# Patient Record
Sex: FEMALE | Race: BLACK OR AFRICAN AMERICAN | NOT HISPANIC OR LATINO | Employment: UNEMPLOYED | URBAN - METROPOLITAN AREA
[De-identification: names, ages, dates, MRNs, and addresses within clinical notes are randomized per-mention and may not be internally consistent; named-entity substitution may affect disease eponyms.]

---

## 2018-01-31 ENCOUNTER — HISTORICAL (OUTPATIENT)
Dept: ADMINISTRATIVE | Facility: HOSPITAL | Age: 23
End: 2018-01-31

## 2018-01-31 LAB
ABS NEUT (OLG): 5.11 X10(3)/MCL (ref 2.1–9.2)
BASOPHILS # BLD AUTO: 0.02 X10(3)/MCL
BASOPHILS NFR BLD AUTO: 0 % (ref 0–1)
BILIRUB SERPL-MCNC: NEGATIVE MG/DL
BUN SERPL-MCNC: 10 MG/DL (ref 7–18)
CALCIUM SERPL-MCNC: 9.3 MG/DL (ref 8.5–10.1)
CHLORIDE SERPL-SCNC: 99 MMOL/L (ref 98–107)
CLARITY, POC UA: NORMAL
CO2 SERPL-SCNC: 26 MMOL/L (ref 21–32)
COLOR, POC UA: YELLOW
CREAT SERPL-MCNC: 0.6 MG/DL (ref 0.6–1.3)
EOSINOPHIL # BLD AUTO: 0.05 X10(3)/MCL
EOSINOPHIL NFR BLD AUTO: 1 % (ref 0–5)
ERYTHROCYTE [DISTWIDTH] IN BLOOD BY AUTOMATED COUNT: 13.4 % (ref 11.5–14.5)
GLUCOSE SERPL-MCNC: 100 MG/DL (ref 74–106)
GLUCOSE UR QL STRIP: NEGATIVE
HBV SURFACE AG SERPL QL IA: NEGATIVE
HCT VFR BLD AUTO: 33.7 % (ref 35–46)
HCV AB SERPL QL IA: NONREACTIVE
HGB BLD-MCNC: 11.3 GM/DL (ref 12–16)
HIV 1+2 AB+HIV1 P24 AG SERPL QL IA: NONREACTIVE
IMM GRANULOCYTES # BLD AUTO: 0.03 10*3/UL
IMM GRANULOCYTES NFR BLD AUTO: 0 %
KETONES UR QL STRIP: NEGATIVE
LEUKOCYTE EST, POC UA: NEGATIVE
LYMPHOCYTES # BLD AUTO: 1.66 X10(3)/MCL
LYMPHOCYTES NFR BLD AUTO: 23 % (ref 15–40)
MCH RBC QN AUTO: 28.2 PG (ref 26–34)
MCHC RBC AUTO-ENTMCNC: 33.5 GM/DL (ref 31–37)
MCV RBC AUTO: 84 FL (ref 80–100)
MONOCYTES # BLD AUTO: 0.27 X10(3)/MCL
MONOCYTES NFR BLD AUTO: 4 % (ref 4–12)
NEUTROPHILS # BLD AUTO: 5.11 X10(3)/MCL
NEUTROPHILS NFR BLD AUTO: 72 X10(3)/MCL
NITRITE, POC UA: NEGATIVE
PH, POC UA: 7
PLATELET # BLD AUTO: 194 X10(3)/MCL (ref 130–400)
PMV BLD AUTO: 12.1 FL (ref 7.4–10.4)
POTASSIUM SERPL-SCNC: 3.2 MMOL/L (ref 3.5–5.1)
PROTEIN, POC: NEGATIVE
RBC # BLD AUTO: 4.01 X10(6)/MCL (ref 4–5.2)
RPR SER QL: NORMAL
SODIUM SERPL-SCNC: 133 MMOL/L (ref 136–145)
SPECIFIC GRAVITY, POC UA: 1.01
T PALLIDUM AB SER QL: REACTIVE
UROBILINOGEN, POC UA: NORMAL
WBC # SPEC AUTO: 7.1 X10(3)/MCL (ref 4.5–11)

## 2018-02-05 LAB
BILIRUB SERPL-MCNC: NEGATIVE MG/DL
BLOOD URINE, POC: NEGATIVE
CLARITY, POC UA: CLEAR
COLOR, POC UA: YELLOW
GLUCOSE UR QL STRIP: NEGATIVE
KETONES UR QL STRIP: NEGATIVE
LEUKOCYTE EST, POC UA: NEGATIVE
NITRITE, POC UA: NEGATIVE
PH, POC UA: 6.5
PROTEIN, POC: NEGATIVE
SPECIFIC GRAVITY, POC UA: 1
UROBILINOGEN, POC UA: NORMAL

## 2018-03-05 LAB
BILIRUB SERPL-MCNC: NEGATIVE MG/DL
BLOOD URINE, POC: NEGATIVE
CLARITY, POC UA: NORMAL
COLOR, POC UA: YELLOW
GLUCOSE UR QL STRIP: NEGATIVE
KETONES UR QL STRIP: NEGATIVE
LEUKOCYTE EST, POC UA: NEGATIVE
NITRITE, POC UA: NEGATIVE
PH, POC UA: 6.5
PROTEIN, POC: NORMAL
SPECIFIC GRAVITY, POC UA: 1.01
UROBILINOGEN, POC UA: NORMAL

## 2018-04-03 LAB
BILIRUB SERPL-MCNC: NEGATIVE MG/DL
BLOOD URINE, POC: NEGATIVE
CLARITY, POC UA: NORMAL
COLOR, POC UA: NORMAL
GLUCOSE UR QL STRIP: NEGATIVE
KETONES UR QL STRIP: NEGATIVE
LEUKOCYTE EST, POC UA: NORMAL
NITRITE, POC UA: NEGATIVE
PH, POC UA: 7
PROTEIN, POC: NORMAL
SPECIFIC GRAVITY, POC UA: 1.01
UROBILINOGEN, POC UA: NORMAL

## 2018-05-02 LAB
BILIRUB SERPL-MCNC: NEGATIVE MG/DL
BLOOD URINE, POC: NEGATIVE
CLARITY, POC UA: NORMAL
COLOR, POC UA: YELLOW
GLUCOSE UR QL STRIP: NEGATIVE
KETONES UR QL STRIP: NEGATIVE
LEUKOCYTE EST, POC UA: NORMAL
NITRITE, POC UA: NEGATIVE
PH, POC UA: 7
PROTEIN, POC: NORMAL
SPECIFIC GRAVITY, POC UA: 1.01
UROBILINOGEN, POC UA: NORMAL

## 2018-05-25 ENCOUNTER — HISTORICAL (OUTPATIENT)
Dept: ADMINISTRATIVE | Facility: HOSPITAL | Age: 23
End: 2018-05-25

## 2018-05-25 LAB
ABS NEUT (OLG): 5.76 X10(3)/MCL (ref 2.1–9.2)
BASOPHILS # BLD AUTO: 0.04 X10(3)/MCL
BASOPHILS NFR BLD AUTO: 0 %
BILIRUB SERPL-MCNC: NEGATIVE MG/DL
BLOOD URINE, POC: NEGATIVE
CLARITY, POC UA: CLEAR
COLOR, POC UA: NORMAL
EOSINOPHIL # BLD AUTO: 0.1 X10(3)/MCL
EOSINOPHIL NFR BLD AUTO: 1 %
ERYTHROCYTE [DISTWIDTH] IN BLOOD BY AUTOMATED COUNT: 13.3 % (ref 11.5–14.5)
GLUCOSE 1H P 100 G GLC PO SERPL-MCNC: 121 MG/DL
GLUCOSE UR QL STRIP: NEGATIVE
HCT VFR BLD AUTO: 28 % (ref 35–46)
HGB BLD-MCNC: 8.9 GM/DL (ref 12–16)
IMM GRANULOCYTES # BLD AUTO: 0.11 10*3/UL
IMM GRANULOCYTES NFR BLD AUTO: 1 %
KETONES UR QL STRIP: NORMAL
LEUKOCYTE EST, POC UA: NORMAL
LYMPHOCYTES # BLD AUTO: 2.03 X10(3)/MCL
LYMPHOCYTES NFR BLD AUTO: 24 % (ref 13–40)
MCH RBC QN AUTO: 28.3 PG (ref 26–34)
MCHC RBC AUTO-ENTMCNC: 31.8 GM/DL (ref 31–37)
MCV RBC AUTO: 88.9 FL (ref 80–100)
MONOCYTES # BLD AUTO: 0.3 X10(3)/MCL
MONOCYTES NFR BLD AUTO: 4 % (ref 4–12)
NEUTROPHILS # BLD AUTO: 5.76 X10(3)/MCL
NEUTROPHILS NFR BLD AUTO: 69 X10(3)/MCL
NITRITE, POC UA: NEGATIVE
PH, POC UA: 7
PLATELET # BLD AUTO: 239 X10(3)/MCL (ref 130–400)
PMV BLD AUTO: 11.2 FL (ref 7.4–10.4)
PROTEIN, POC: NORMAL
RBC # BLD AUTO: 3.15 X10(6)/MCL (ref 4–5.2)
RPR SER QL: REACTIVE
SPECIFIC GRAVITY, POC UA: 1
T PALLIDUM AB SER QL: REACTIVE
UROBILINOGEN, POC UA: NORMAL
WBC # SPEC AUTO: 8.3 X10(3)/MCL (ref 4.5–11)

## 2018-06-14 LAB
BILIRUB SERPL-MCNC: NEGATIVE MG/DL
BLOOD URINE, POC: NEGATIVE
CLARITY, POC UA: CLEAR
COLOR, POC UA: YELLOW
GLUCOSE UR QL STRIP: NEGATIVE
KETONES UR QL STRIP: NORMAL
LEUKOCYTE EST, POC UA: NORMAL
NITRITE, POC UA: NEGATIVE
PH, POC UA: 6.5
PROTEIN, POC: NORMAL
SPECIFIC GRAVITY, POC UA: 1.01
UROBILINOGEN, POC UA: NORMAL

## 2018-07-03 LAB
BILIRUB SERPL-MCNC: NEGATIVE MG/DL
BLOOD URINE, POC: NEGATIVE
CLARITY, POC UA: NORMAL
COLOR, POC UA: NORMAL
GLUCOSE UR QL STRIP: NEGATIVE
KETONES UR QL STRIP: NEGATIVE
LEUKOCYTE EST, POC UA: NEGATIVE
NITRITE, POC UA: NEGATIVE
PH, POC UA: 7
PROTEIN, POC: NORMAL
SPECIFIC GRAVITY, POC UA: 1.01
UROBILINOGEN, POC UA: NORMAL

## 2018-07-17 ENCOUNTER — HISTORICAL (OUTPATIENT)
Dept: ADMINISTRATIVE | Facility: HOSPITAL | Age: 23
End: 2018-07-17

## 2018-07-17 LAB
ABS NEUT (OLG): 4.8 X10(3)/MCL (ref 2.1–9.2)
BASOPHILS # BLD AUTO: 0.03 X10(3)/MCL
BASOPHILS NFR BLD AUTO: 0 %
BILIRUB SERPL-MCNC: NEGATIVE MG/DL
BLOOD URINE, POC: NEGATIVE
CLARITY, POC UA: NORMAL
COLOR, POC UA: NORMAL
EOSINOPHIL # BLD AUTO: 0.04 10*3/UL
EOSINOPHIL NFR BLD AUTO: 0 %
ERYTHROCYTE [DISTWIDTH] IN BLOOD BY AUTOMATED COUNT: 14.4 % (ref 11.5–14.5)
GLUCOSE UR QL STRIP: NEGATIVE
HCT VFR BLD AUTO: 27.6 % (ref 35–46)
HGB BLD-MCNC: 8.8 GM/DL (ref 12–16)
HIV 1+2 AB+HIV1 P24 AG SERPL QL IA: NONREACTIVE
IMM GRANULOCYTES # BLD AUTO: 0.09 10*3/UL
IMM GRANULOCYTES NFR BLD AUTO: 1 %
KETONES UR QL STRIP: NEGATIVE
LEUKOCYTE EST, POC UA: NORMAL
LYMPHOCYTES # BLD AUTO: 1.83 X10(3)/MCL
LYMPHOCYTES NFR BLD AUTO: 25 % (ref 13–40)
MCH RBC QN AUTO: 28 PG (ref 26–34)
MCHC RBC AUTO-ENTMCNC: 31.9 GM/DL (ref 31–37)
MCV RBC AUTO: 87.9 FL (ref 80–100)
MONOCYTES # BLD AUTO: 0.5 X10(3)/MCL
MONOCYTES NFR BLD AUTO: 7 % (ref 4–12)
NEUTROPHILS # BLD AUTO: 4.8 X10(3)/MCL
NEUTROPHILS NFR BLD AUTO: 66 X10(3)/MCL
NITRITE, POC UA: NEGATIVE
PH, POC UA: 7
PLATELET # BLD AUTO: 218 X10(3)/MCL (ref 130–400)
PMV BLD AUTO: 11.6 FL (ref 7.4–10.4)
PROTEIN, POC: NORMAL
RBC # BLD AUTO: 3.14 X10(6)/MCL (ref 4–5.2)
RPR SER QL: REACTIVE
SPECIFIC GRAVITY, POC UA: 1.01
T PALLIDUM AB SER QL: REACTIVE
UROBILINOGEN, POC UA: NORMAL
WBC # SPEC AUTO: 7.3 X10(3)/MCL (ref 4.5–11)

## 2018-07-24 ENCOUNTER — HOSPITAL ENCOUNTER (OUTPATIENT)
Dept: OBSTETRICS AND GYNECOLOGY | Facility: HOSPITAL | Age: 23
End: 2018-07-24
Attending: OBSTETRICS & GYNECOLOGY | Admitting: OBSTETRICS & GYNECOLOGY

## 2018-07-24 LAB
BILIRUB SERPL-MCNC: NEGATIVE MG/DL
BLOOD URINE, POC: NEGATIVE
CLARITY, POC UA: CLEAR
COLOR, POC UA: NORMAL
GLUCOSE UR QL STRIP: NEGATIVE
KETONES UR QL STRIP: NEGATIVE
LEUKOCYTE EST, POC UA: NORMAL
NITRITE, POC UA: NEGATIVE
PH, POC UA: 7
PROTEIN, POC: NORMAL
SPECIFIC GRAVITY, POC UA: 1.01
UROBILINOGEN, POC UA: NORMAL

## 2018-07-30 LAB
BILIRUB SERPL-MCNC: NEGATIVE MG/DL
BLOOD URINE, POC: NEGATIVE
CLARITY, POC UA: CLEAR
COLOR, POC UA: YELLOW
GLUCOSE UR QL STRIP: NEGATIVE
KETONES UR QL STRIP: NEGATIVE
LEUKOCYTE EST, POC UA: NORMAL
NITRITE, POC UA: NEGATIVE
PH, POC UA: 7
PROTEIN, POC: NORMAL
SPECIFIC GRAVITY, POC UA: 1.01
UROBILINOGEN, POC UA: NORMAL

## 2018-09-06 LAB — POC BETA-HCG (QUAL): NEGATIVE

## 2018-10-19 LAB
BILIRUB SERPL-MCNC: NEGATIVE MG/DL
BLOOD URINE, POC: NEGATIVE
CLARITY, POC UA: NORMAL
COLOR, POC UA: YELLOW
GLUCOSE UR QL STRIP: NEGATIVE
KETONES UR QL STRIP: NEGATIVE
LEUKOCYTE EST, POC UA: NORMAL
NITRITE, POC UA: NEGATIVE
PH, POC UA: 6.5
POC BETA-HCG (QUAL): NEGATIVE
PROTEIN, POC: NEGATIVE
SPECIFIC GRAVITY, POC UA: 1.02
UROBILINOGEN, POC UA: NORMAL

## 2022-04-11 ENCOUNTER — HISTORICAL (OUTPATIENT)
Dept: ADMINISTRATIVE | Facility: HOSPITAL | Age: 27
End: 2022-04-11

## 2022-04-27 VITALS
HEIGHT: 62 IN | BODY MASS INDEX: 20.53 KG/M2 | SYSTOLIC BLOOD PRESSURE: 117 MMHG | OXYGEN SATURATION: 98 % | DIASTOLIC BLOOD PRESSURE: 70 MMHG | WEIGHT: 111.56 LBS

## 2022-09-22 ENCOUNTER — HISTORICAL (OUTPATIENT)
Dept: ADMINISTRATIVE | Facility: HOSPITAL | Age: 27
End: 2022-09-22

## 2023-10-09 ENCOUNTER — OFFICE VISIT (OUTPATIENT)
Dept: FAMILY MEDICINE | Facility: CLINIC | Age: 28
End: 2023-10-09
Payer: MEDICAID

## 2023-10-09 VITALS
HEART RATE: 110 BPM | OXYGEN SATURATION: 100 % | WEIGHT: 148.56 LBS | HEIGHT: 61 IN | BODY MASS INDEX: 28.05 KG/M2 | TEMPERATURE: 100 F | DIASTOLIC BLOOD PRESSURE: 80 MMHG | SYSTOLIC BLOOD PRESSURE: 116 MMHG

## 2023-10-09 DIAGNOSIS — R10.32 LEFT LOWER QUADRANT ABDOMINAL PAIN: ICD-10-CM

## 2023-10-09 DIAGNOSIS — Z30.432 ENCOUNTER FOR REMOVAL OF INTRAUTERINE CONTRACEPTIVE DEVICE (IUD): Primary | ICD-10-CM

## 2023-10-09 DIAGNOSIS — Z12.4 ENCOUNTER FOR PAPANICOLAOU SMEAR OF CERVIX: ICD-10-CM

## 2023-10-09 PROCEDURE — 87591 N.GONORRHOEAE DNA AMP PROB: CPT

## 2023-10-09 PROCEDURE — 99213 OFFICE O/P EST LOW 20 MIN: CPT | Mod: PBBFAC,25

## 2023-10-09 PROCEDURE — 88174 CYTOPATH C/V AUTO IN FLUID: CPT

## 2023-10-09 PROCEDURE — 87624 HPV HI-RISK TYP POOLED RSLT: CPT

## 2023-10-09 PROCEDURE — 87491 CHLMYD TRACH DNA AMP PROBE: CPT

## 2023-10-09 PROCEDURE — 58301 REMOVE INTRAUTERINE DEVICE: CPT | Mod: PBBFAC

## 2023-10-09 RX ORDER — MEDROXYPROGESTERONE ACETATE 150 MG/ML
150 INJECTION, SUSPENSION INTRAMUSCULAR
Status: DISCONTINUED | OUTPATIENT
Start: 2023-10-09 | End: 2023-10-09

## 2023-10-09 NOTE — PROGRESS NOTES
"[facility] [place]  Yaz Hutchinson  [id number]  10/09/2023    Chief Complaint   Patient presents with    IUD REMOVAL       Yaz Hutchinson is a 28 y.o. female  presenting to [facility] for IUD removal and pap smear. She has no past medical history. The IUD was placed at [facility] in 2018. She reports weight gain with no mood alteration while implanted. She desires the depo provera shot for further contraception. Complaint of left lower abdominal pain x2 days with urinary urgency. Tried otc azo pills, but did not help. Denies radiation, fever, chills, hematuria, dysuria, change in urinary frequency, vaginal discharge, vaginal odor, vaginal bleeding, itching, constipation, diarrhea, melena.     Surgical History  Appendectomy age 8  Cholecystectomy in     OBGYN History  , vaginal delivery x 3, last pregnancy positive for chlamydia and positive RPR and syphilis antibody with appropriate treatment.   Menarche at age 10, no period in the last 5 years due to IUD.    Social History:  Currently sexually active with 1 male partner, uses condoms, does not desire more children  Denies tobacco use, reports only occasional alcohol use      Review of Systems  As per HPI  Vitals:    10/09/23 1006   BP: 116/80   Pulse: 110   Temp: (!) 100.4 °F (38 °C)   TempSrc: Oral   SpO2: 100%   Weight: 67.4 kg (148 lb 9.4 oz)   Height: 5' 1" (1.549 m)      Physical Exam  Physical Exam  Exam conducted with a chaperone present.   Constitutional:       General: She is not in acute distress.     Comments: Pleasant, well appearing, NAD   Eyes:      Extraocular Movements: Extraocular movements intact.      Conjunctiva/sclera: Conjunctivae normal.   Abdominal:      General: Abdomen is flat.      Palpations: Abdomen is soft.      Tenderness: There is abdominal tenderness (mild left lower quadrant). There is no guarding.   Genitourinary:     Comments: The patient was given opportunity to empty bladder, and draped appropriately. Patient in lithotomy " position.     Vulva:no lesions  Vaginal: some discomfort on insertion of speculum, no atrophy, scant bloody discharge, no lesions   Cervix: multiparous slit like os, nontender, bloody discharge, IUD strings visualized  Bimanual exam deferred  Neurological:      General: No focal deficit present.      Mental Status: She is alert.   Psychiatric:         Mood and Affect: Mood normal.        Procedure Note  Procedure: IUD removal  Supervising physician: Dr. Lara Amaya  The speculum was placed and the IUD string was visualized. Using tenaculum instrument the cervix was stabilized, using ringed forceps, the IUD string was grasped and the device was removed without difficulty. The patient tolerated the procedure without complications. Standard post-procedure care was explained and return precautions given.    Current Medications:   No current outpatient medications on file.     No current facility-administered medications for this visit.       Assessment:   1. Encounter for removal of intrauterine contraceptive device (IUD)  Patient tolerated procedure without complication  Instructed to expect some spotting over the next few days, to take tylenol for pain  Interested in Depo provera for future contraception, could not leave urine for UPT today, will make appointment for depo at a later date    2. Encounter for Papanicolaou smear of cervix  Tolerated procedure, instructions as above for pain and spotting    3. Left lower quadrant abdominal pain  Low grade temper today, denied fevers at home, per exam and history, low suspicion for pyelonephritis at this time  Patient unable to leave urine for uti evaluation  Instructed to return to clinic  for urine evaluation when able or ER if symptoms persist or worsen    Orders Placed This Encounter    Liquid-Based Pap Smear, Screening Screening         Toni Kaur DO  [employer]  Resident, HO-1

## 2023-10-10 NOTE — PROGRESS NOTES
I reviewed History, PE, A/P and medical record.  Services provided in outpatient department of a teaching hospital/facility, I was immediately available.  I agree with resident. Care provided was reasonable and necessary.   I evaluated the patient with resident at time of visit. IUD removal uncomplicated.  Note reviewed: Tachycardic and febrile. Patient contacted: denied cough/cold symptoms. Reports feeling well. Does have dysuria. Unable to come to clinic this morning for assessment and UA/Culture. Would like to wait until 10/12/23 for follow up. Strict ED precautions given.

## 2023-10-11 LAB — PSYCHE PATHOLOGY RESULT: NORMAL

## 2023-10-12 ENCOUNTER — OFFICE VISIT (OUTPATIENT)
Dept: FAMILY MEDICINE | Facility: CLINIC | Age: 28
End: 2023-10-12
Payer: MEDICAID

## 2023-10-12 VITALS
DIASTOLIC BLOOD PRESSURE: 82 MMHG | TEMPERATURE: 99 F | OXYGEN SATURATION: 100 % | HEIGHT: 61 IN | BODY MASS INDEX: 27.6 KG/M2 | SYSTOLIC BLOOD PRESSURE: 118 MMHG | HEART RATE: 73 BPM | RESPIRATION RATE: 20 BRPM | WEIGHT: 146.19 LBS

## 2023-10-12 DIAGNOSIS — Z30.9 ENCOUNTER FOR CONTRACEPTIVE MANAGEMENT, UNSPECIFIED TYPE: Primary | ICD-10-CM

## 2023-10-12 DIAGNOSIS — Z23 IMMUNIZATION DUE: ICD-10-CM

## 2023-10-12 LAB
B-HCG UR QL: NEGATIVE
CTP QC/QA: YES

## 2023-10-12 PROCEDURE — 90471 IMMUNIZATION ADMIN: CPT | Mod: PBBFAC

## 2023-10-12 PROCEDURE — 90686 IIV4 VACC NO PRSV 0.5 ML IM: CPT | Mod: PBBFAC

## 2023-10-12 PROCEDURE — 96372 THER/PROPH/DIAG INJ SC/IM: CPT | Mod: PBBFAC

## 2023-10-12 PROCEDURE — 81025 URINE PREGNANCY TEST: CPT | Mod: PBBFAC | Performed by: STUDENT IN AN ORGANIZED HEALTH CARE EDUCATION/TRAINING PROGRAM

## 2023-10-12 PROCEDURE — 99213 OFFICE O/P EST LOW 20 MIN: CPT | Mod: PBBFAC | Performed by: STUDENT IN AN ORGANIZED HEALTH CARE EDUCATION/TRAINING PROGRAM

## 2023-10-12 RX ORDER — MEDROXYPROGESTERONE ACETATE 150 MG/ML
150 INJECTION, SUSPENSION INTRAMUSCULAR
Status: COMPLETED | OUTPATIENT
Start: 2023-10-12 | End: 2023-10-12

## 2023-10-12 RX ADMIN — MEDROXYPROGESTERONE ACETATE 150 MG: 150 INJECTION, SUSPENSION INTRAMUSCULAR at 09:10

## 2023-10-12 RX ADMIN — INFLUENZA VIRUS VACCINE 0.5 ML: 15; 15; 15; 15 SUSPENSION INTRAMUSCULAR at 09:10

## 2023-10-12 NOTE — PROGRESS NOTES
"[facility] [place]  Yaz Hutchinson  [id number]  10/12/2023    Chief Complaint   Patient presents with    Follow-up     Follow up depgeeta and upt ,wants flu vaccine       Yaz Hutchinson is a 28 y.o. female  presenting to [facility] for discussion of contraception management.  Notably, IUD removed on 10/09/2023 as was .  Interested in Depo-Provera injections, tolerated well in past.  Has been amenorrheic since IUD insertion in no sexual activity or bleeding in interim.    Surgical History  Appendectomy age 8  Cholecystectomy in 2015    OBGYN History  , vaginal delivery x 3, last pregnancy positive for chlamydia and positive RPR and syphilis antibody with appropriate treatment.   Menarche at age 10, no period in the last 5 years due to IUD.    Social History:  Currently sexually active with 1 male partner, uses condoms, does not desire more children  Denies tobacco use, reports only occasional alcohol use      Review of Systems  As per HPI  Vitals:    10/12/23 0904   BP: 118/82   Pulse: 73   Resp: 20   Temp: 98.6 °F (37 °C)   SpO2: 100%   Weight: 66.3 kg (146 lb 3.2 oz)   Height: 5' 1" (1.549 m)      Physical Exam  Physical Exam  Exam conducted with a chaperone present.   Constitutional:       General: She is not in acute distress.     Comments: Pleasant, well appearing, NAD   HENT:      Head: Normocephalic.   Eyes:      Extraocular Movements: Extraocular movements intact.      Conjunctiva/sclera: Conjunctivae normal.   Cardiovascular:      Rate and Rhythm: Normal rate and regular rhythm.      Pulses: Normal pulses.   Pulmonary:      Effort: Pulmonary effort is normal.   Abdominal:      General: Abdomen is flat.      Palpations: Abdomen is soft.      Tenderness: There is no abdominal tenderness (mild left lower quadrant).   Musculoskeletal:      Cervical back: Normal range of motion.   Neurological:      General: No focal deficit present.      Mental Status: She is alert.   Psychiatric:         Mood and " Affect: Mood normal.            Assessment:   1. Encounter for contraceptive management, unspecified type    - discussed contraceptive options, risks and potential side effects.  Patient's opts for Depo injection today  - UPT negative  - POCT urine pregnancy  - medroxyPROGESTERone (DEPO-PROVERA) injection 150 mg    2. Immunization due    - influenza (QUADRIVALENT PF) vaccine 0.5 mL      Orders Placed This Encounter    POCT urine pregnancy    medroxyPROGESTERone (DEPO-PROVERA) injection 150 mg    influenza (QUADRIVALENT PF) vaccine 0.5 mL         Calvin Mendiola MD  [employer]  Resident HO-3

## 2023-10-12 NOTE — PROGRESS NOTES
I have discussed the case with the resident and reviewed the resident's history and physical, assessment, plan, and progress note. I agree with the findings.       Bucky Sahu MD  [facility]

## 2023-10-14 ENCOUNTER — TELEPHONE (OUTPATIENT)
Dept: FAMILY MEDICINE | Facility: CLINIC | Age: 28
End: 2023-10-14
Payer: MEDICAID

## 2023-10-14 RX ORDER — METRONIDAZOLE 500 MG/1
500 TABLET ORAL 2 TIMES DAILY
Qty: 14 TABLET | Refills: 0 | Status: SHIPPED | OUTPATIENT
Start: 2023-10-14 | End: 2023-10-21

## 2023-10-14 NOTE — TELEPHONE ENCOUNTER
Called patient with name and  verified with result, positive trich. No known drug allergies verified, was instructed to inform partner to see PCP.  Opportunity to ask questions was provided with rtc/ed precautions. Consider test of cure in 2 weeks to 3 months if partner has received treatment.

## 2024-02-16 ENCOUNTER — OFFICE VISIT (OUTPATIENT)
Dept: FAMILY MEDICINE | Facility: CLINIC | Age: 29
End: 2024-02-16
Payer: MEDICAID

## 2024-02-16 VITALS
SYSTOLIC BLOOD PRESSURE: 111 MMHG | RESPIRATION RATE: 18 BRPM | DIASTOLIC BLOOD PRESSURE: 77 MMHG | BODY MASS INDEX: 23.6 KG/M2 | WEIGHT: 125 LBS | TEMPERATURE: 99 F | OXYGEN SATURATION: 99 % | HEART RATE: 81 BPM | HEIGHT: 61 IN

## 2024-02-16 DIAGNOSIS — Z30.9 ENCOUNTER FOR CONTRACEPTIVE MANAGEMENT, UNSPECIFIED TYPE: Primary | ICD-10-CM

## 2024-02-16 LAB
B-HCG UR QL: NEGATIVE
CTP QC/QA: YES

## 2024-02-16 PROCEDURE — 96372 THER/PROPH/DIAG INJ SC/IM: CPT | Mod: PBBFAC

## 2024-02-16 PROCEDURE — 81025 URINE PREGNANCY TEST: CPT | Mod: PBBFAC | Performed by: STUDENT IN AN ORGANIZED HEALTH CARE EDUCATION/TRAINING PROGRAM

## 2024-02-16 PROCEDURE — 99213 OFFICE O/P EST LOW 20 MIN: CPT | Mod: PBBFAC,25 | Performed by: STUDENT IN AN ORGANIZED HEALTH CARE EDUCATION/TRAINING PROGRAM

## 2024-02-16 RX ORDER — MEDROXYPROGESTERONE ACETATE 150 MG/ML
150 INJECTION, SUSPENSION INTRAMUSCULAR
Status: COMPLETED | OUTPATIENT
Start: 2024-02-16 | End: 2024-02-16

## 2024-02-16 RX ADMIN — MEDROXYPROGESTERONE ACETATE 150 MG: 150 INJECTION, SUSPENSION INTRAMUSCULAR at 03:02

## 2024-02-16 NOTE — PROGRESS NOTES
"[facility] Progress Note    ID:  Yaz Hutchinson   MRN:  [id number]     2/16/2024    Chief Complaint:  Contraception management    History of Present Illness:  Yaz Hutchinson is a 28 y.o. female who presents to [facility] clinic for contraception management.  Currently treated with Depo-Provera injections, last 10/2023 need for rescheduled for appointment.  Is sexually active with 1 male partner, denies menstrual cycle, unknown LMP.  Prior methods include IUD.  Denies any bleeding, vaginal discharge, abdominal pain, concern for STI, prior hx of blood clot or migraine with aura.      Review of Systems:  See HPI      Physical Exam:  /77 (BP Location: Right arm, Patient Position: Sitting, BP Method: Medium (Automatic))   Pulse 81   Temp 98.6 °F (37 °C) (Oral)   Resp 18   Ht 5' 0.98" (1.549 m)   Wt 56.7 kg (125 lb)   LMP  (LMP Unknown)   SpO2 99%   BMI 23.63 kg/m²     Gen-well-appearing, NAD  Abd-  soft, NT  Skin-no wounds or rashes      Assessment/Plan:    1. Encounter for contraceptive management, unspecified type  - UPT neg  - Depo injection as below  - F/u in 11-12 weeks for repeat injection    Calvin Mendiola MD  [facility] Resident HO-3    "

## 2024-05-07 ENCOUNTER — OFFICE VISIT (OUTPATIENT)
Dept: FAMILY MEDICINE | Facility: CLINIC | Age: 29
End: 2024-05-07
Payer: MEDICAID

## 2024-05-07 VITALS
BODY MASS INDEX: 30.23 KG/M2 | TEMPERATURE: 98 F | HEIGHT: 60 IN | DIASTOLIC BLOOD PRESSURE: 78 MMHG | SYSTOLIC BLOOD PRESSURE: 115 MMHG | WEIGHT: 154 LBS | OXYGEN SATURATION: 100 % | RESPIRATION RATE: 18 BRPM | HEART RATE: 84 BPM

## 2024-05-07 DIAGNOSIS — Z30.42 ENCOUNTER FOR SURVEILLANCE OF INJECTABLE CONTRACEPTIVE: Primary | ICD-10-CM

## 2024-05-07 LAB
B-HCG UR QL: NEGATIVE
CTP QC/QA: YES

## 2024-05-07 PROCEDURE — 81025 URINE PREGNANCY TEST: CPT | Mod: PBBFAC | Performed by: STUDENT IN AN ORGANIZED HEALTH CARE EDUCATION/TRAINING PROGRAM

## 2024-05-07 PROCEDURE — 99214 OFFICE O/P EST MOD 30 MIN: CPT | Mod: PBBFAC | Performed by: STUDENT IN AN ORGANIZED HEALTH CARE EDUCATION/TRAINING PROGRAM

## 2024-05-07 PROCEDURE — 96372 THER/PROPH/DIAG INJ SC/IM: CPT | Mod: PBBFAC

## 2024-05-07 RX ORDER — MEDROXYPROGESTERONE ACETATE 150 MG/ML
150 INJECTION, SUSPENSION INTRAMUSCULAR ONCE
Qty: 1 ML | Refills: 0 | Status: SHIPPED | OUTPATIENT
Start: 2024-05-07 | End: 2024-05-07

## 2024-05-07 RX ORDER — MEDROXYPROGESTERONE ACETATE 150 MG/ML
150 INJECTION, SUSPENSION INTRAMUSCULAR ONCE
Status: COMPLETED | OUTPATIENT
Start: 2024-05-07 | End: 2024-05-07

## 2024-05-07 RX ADMIN — MEDROXYPROGESTERONE ACETATE 150 MG: 150 INJECTION, SUSPENSION INTRAMUSCULAR at 09:05

## 2024-05-07 NOTE — PROGRESS NOTES
[facility] Progress Note    ID:  Yaz Hutchinson   MRN:  [id number]     5/7/2024    Chief Complaint:  Contraception management    History of Present Illness:  Yaz Hutchinson is a 29 y.o. female who presents to [facility] clinic for contraception management.  Currently treated with Depo-Provera injections. Last injection 2/16/24 Sexually active with 1 male partner, denies menstrual cycle, unknown LMP.  Prior methods include IUD.  Denies any bleeding, vaginal discharge, abdominal pain, concern for STI, prior hx of blood clot or migraine with aura.      Review of Systems:  See HPI      Physical Exam:  /78 (BP Location: Left arm, Patient Position: Sitting, BP Method: Medium (Automatic))   Pulse 84   Temp 98.2 °F (36.8 °C) (Oral)   Resp 18   Ht 5' (1.524 m)   Wt 69.9 kg (154 lb)   SpO2 100%   BMI 30.08 kg/m²     Gen-well-appearing, NAD  Abd-  soft, NT  Skin-no wounds or rashes      Assessment/Plan:    1. Encounter for contraceptive management, unspecified type  - UPT neg  - Depo injection as below  - F/u in 11-12 weeks for repeat injection    Calvin Mendiola MD  [employer]  Resident HO-3

## 2024-05-20 NOTE — PROGRESS NOTES
Faculty addendum: Patient discussed with resident. Chart was reviewed including vitals, labs, etc. Care provided reasonable and necessary. I participated in the management of the patient and was immediately available throughout the encounter. Services were furnished in a primary care center located in the outpatient department of a [facility] hospital. I agree with the resident's findings and plan as documented in the resident's note.

## 2024-07-28 NOTE — PROGRESS NOTES
[facility] Progress Note    ID:  Yaz Hutchinson   MRN:  [id number]     7/29/2024    Chief Complaint:    Chief Complaint   Patient presents with    Follow-up    Contraception     History of Present Illness:  Yaz Hutchinson is a 29 y.o. female who presents to [facility] for contraception management. Patient last visit on 5/7/24 for depo-provera injection. Last injection given at that time on 5/7/24. First depo injection was 10/12/23. Patient currently sexually active with 1 male partner.  Denied any breakthrough menstrual bleeding, unknown LMP.  Prior methods include IUD. Denies any bleeding, vaginal discharge, abdominal pain, concern for STI, prior hx of blood clot or migraine with aura. Next injection due 7/30/24 (12 weeks) with earliest date 7/16/24 (10 weeks) and latest date being 08/04/24 (12 weeks +5 days). Patient within window for depo shot.    Medical History  Review of patient's allergies indicates:  No Known Allergies  No past medical history on file.  Social History     Tobacco Use    Smoking status: Never    Smokeless tobacco: Never     No past surgical history on file.  family history is not on file.       Medication List with Changes/Refills   Current Medications    MEDROXYPROGESTERONE (DEPO-PROVERA) 150 MG/ML SYRG    Inject 1 mL (150 mg total) into the muscle once. for 1 dose       Review of Systems:  ROS reviewed with patient and updated below.  Review of Systems   Constitutional:  Negative for chills and fever.   Respiratory:  Negative for shortness of breath.    Cardiovascular:  Negative for chest pain.   Gastrointestinal:  Negative for nausea and vomiting.   Genitourinary:  Negative for dysuria.       Pertinent positives and negatives as mentioned in HPI    Objective:    Vitals:    07/29/24 0904   BP: 103/72   Pulse: 72   Temp: 98.4 °F (36.9 °C)       Physical Exam  Constitutional:       General: She is not in acute distress.     Appearance: Normal appearance. She is not ill-appearing.    HENT:      Head: Normocephalic and atraumatic.   Eyes:      General: No scleral icterus.  Cardiovascular:      Rate and Rhythm: Normal rate and regular rhythm.      Pulses: Normal pulses.      Heart sounds: Normal heart sounds.   Pulmonary:      Effort: Pulmonary effort is normal.      Breath sounds: Normal breath sounds. No stridor. No wheezing.   Neurological:      Mental Status: She is alert.            Lab Results   Component Value Date     08/02/2018    K 3.9 08/02/2018     08/02/2018    CO2 25.0 08/02/2018    BUN 6.0 (L) 08/02/2018    CREATININE 0.80 08/02/2018    EGFRIFAFRICA >60 08/02/2018    EGFRNONAA >60 08/02/2018    AST 23 08/02/2018    ALT 21 08/02/2018     CBC:  Lab Results   Component Value Date    WBC 16.4 (H) 08/02/2018    HGB 7.5 (L) 08/02/2018    HCT 24.6 (L) 08/02/2018    MCV 90.4 08/02/2018     08/02/2018       Assessment/Plan:  Yza was seen today for follow-up and contraception.    Diagnoses and all orders for this visit:    Encounter for surveillance of injectable contraceptive  -     POCT urine pregnancy  -     medroxyPROGESTERone (DEPO-PROVERA) injection 150 mg    Cloudy urine  -     POCT urine dipstick without microscope  -     Urinalysis, Reflex to Urine Culture    Other orders  The following orders have not been finalized:  -     Cancel: medroxyPROGESTERone (DEPO-PROVERA) injection 150 mg        Health Maintenance   Topic Date Due    Lipid Panel  Never done    TETANUS VACCINE  07/31/2028    Hepatitis C Screening  Completed     28 yo F presenting for depo-provera shot with last shot being 5/7/24    - Patient appropriately due for next depo shot at this time  - UPC pregnancy test ordered, Negative   - Concentrated urine, poc urine with large WBC, patient asymptomatic at this time  - Will send urine for urinalysis and reflex to culture at this time  - Will administer depo shot  - Patient to RTC in 11 weeks for next shot with earliest date being 10/7/24 and latest  date being 10/26/24  - Patient established as an [facility] patient, will continue subsequent depo shot visits    Follow up in about 11 weeks (around 10/14/2024) for Depo shot. Schedule after 10/7/24 and no later than 10/26/24.    No future appointments.    Alec Jaffe MD  [facility], -[facility]

## 2024-07-29 ENCOUNTER — OFFICE VISIT (OUTPATIENT)
Dept: FAMILY MEDICINE | Facility: CLINIC | Age: 29
End: 2024-07-29
Payer: MEDICAID

## 2024-07-29 ENCOUNTER — TELEPHONE (OUTPATIENT)
Dept: FAMILY MEDICINE | Facility: CLINIC | Age: 29
End: 2024-07-29

## 2024-07-29 VITALS
SYSTOLIC BLOOD PRESSURE: 103 MMHG | TEMPERATURE: 98 F | OXYGEN SATURATION: 100 % | BODY MASS INDEX: 30.26 KG/M2 | HEART RATE: 72 BPM | WEIGHT: 154.13 LBS | HEIGHT: 60 IN | DIASTOLIC BLOOD PRESSURE: 72 MMHG

## 2024-07-29 DIAGNOSIS — A59.9 TRICHOMONAS INFECTION: Primary | ICD-10-CM

## 2024-07-29 DIAGNOSIS — R82.90 CLOUDY URINE: ICD-10-CM

## 2024-07-29 DIAGNOSIS — Z30.42 ENCOUNTER FOR SURVEILLANCE OF INJECTABLE CONTRACEPTIVE: Primary | ICD-10-CM

## 2024-07-29 LAB
B-HCG UR QL: NEGATIVE
BACTERIA #/AREA URNS AUTO: ABNORMAL /HPF
BILIRUB SERPL-MCNC: NEGATIVE MG/DL
BILIRUB UR QL STRIP.AUTO: NEGATIVE
BLOOD URINE, POC: NORMAL
CLARITY UR: ABNORMAL
CLARITY, POC UA: NORMAL
COLOR UR AUTO: YELLOW
COLOR, POC UA: YELLOW
CTP QC/QA: YES
GLUCOSE UR QL STRIP: NEGATIVE
GLUCOSE UR QL STRIP: NORMAL
HGB UR QL STRIP: ABNORMAL
KETONES UR QL STRIP: NEGATIVE
KETONES UR QL STRIP: NEGATIVE
LEUKOCYTE ESTERASE UR QL STRIP: 500
LEUKOCYTE ESTERASE URINE, POC: NORMAL
NITRITE UR QL STRIP: NEGATIVE
NITRITE, POC UA: NEGATIVE
PH UR STRIP: 6.5 [PH]
PH, POC UA: 6.5
PROT UR QL STRIP: ABNORMAL
PROTEIN, POC: NORMAL
RBC #/AREA URNS AUTO: ABNORMAL /HPF
SP GR UR STRIP.AUTO: 1.02 (ref 1–1.03)
SPECIFIC GRAVITY, POC UA: 1.02
SQUAMOUS #/AREA URNS LPF: 1 /HPF
TRICHOMONAS UR QL COMP ASSIST: ABNORMAL /HPF
UROBILINOGEN UR STRIP-ACNC: NORMAL
UROBILINOGEN, POC UA: NORMAL
WBC #/AREA URNS AUTO: ABNORMAL /HPF

## 2024-07-29 PROCEDURE — 87077 CULTURE AEROBIC IDENTIFY: CPT

## 2024-07-29 PROCEDURE — 81001 URINALYSIS AUTO W/SCOPE: CPT

## 2024-07-29 PROCEDURE — 81025 URINE PREGNANCY TEST: CPT | Mod: PBBFAC

## 2024-07-29 PROCEDURE — 99213 OFFICE O/P EST LOW 20 MIN: CPT | Mod: PBBFAC

## 2024-07-29 PROCEDURE — 87086 URINE CULTURE/COLONY COUNT: CPT

## 2024-07-29 PROCEDURE — 87186 SC STD MICRODIL/AGAR DIL: CPT

## 2024-07-29 PROCEDURE — 81002 URINALYSIS NONAUTO W/O SCOPE: CPT | Mod: PBBFAC

## 2024-07-29 RX ORDER — MEDROXYPROGESTERONE ACETATE 150 MG/ML
150 INJECTION, SUSPENSION INTRAMUSCULAR
Status: CANCELLED | OUTPATIENT
Start: 2024-07-29 | End: 2024-07-29

## 2024-07-29 RX ORDER — MEDROXYPROGESTERONE ACETATE 150 MG/ML
150 INJECTION, SUSPENSION INTRAMUSCULAR
Status: COMPLETED | OUTPATIENT
Start: 2024-07-29 | End: 2024-07-29

## 2024-07-29 RX ORDER — METRONIDAZOLE 500 MG/1
500 TABLET ORAL EVERY 12 HOURS
Qty: 14 TABLET | Refills: 0 | Status: SHIPPED | OUTPATIENT
Start: 2024-07-29 | End: 2024-08-05

## 2024-07-29 RX ADMIN — MEDROXYPROGESTERONE ACETATE 150 MG: 150 INJECTION, SUSPENSION INTRAMUSCULAR at 09:07

## 2024-07-29 NOTE — PROGRESS NOTES
Discussed with resident at time of encounter 07-29-24.  Contraception management; Depo Provera; tolerating.    Resident's note reviewed 07-29-24.  UA - pyuria.  Agree with assessment; plan of care appropriate.  Follow-up results of urine culture.  Professional services provided in an outpatient primary care center affiliated with a [facility] institution.

## 2024-07-29 NOTE — TELEPHONE ENCOUNTER
Called patient at phone number in chart.  Verify patient's identity by a date of birth.  Spoke with patient regarding patient's recent urinalysis.  UA with 500 leukocyte esterase, 21-50 WBCs, many bacteria.  Patient's urinalysis also had moderate amounts of Trichomonas.  Discussed results with patient.  Discussed Trichomonas with patient.  Sent metronidazole 500 mg b.i.d. X 7 days.  Discussed with patient the importance that their sexual partner also get treated so to avoid reinfection.  We will retest patient at her next Depo appointment.  All questions were answered.    Alec Jaffe MD  [facility] HO-2

## 2024-07-31 LAB — BACTERIA UR CULT: ABNORMAL

## 2025-07-16 ENCOUNTER — HOSPITAL ENCOUNTER (EMERGENCY)
Facility: HOSPITAL | Age: 30
Discharge: HOME | End: 2025-07-16
Attending: STUDENT IN AN ORGANIZED HEALTH CARE EDUCATION/TRAINING PROGRAM
Payer: MEDICAID

## 2025-07-16 VITALS
BODY MASS INDEX: 28.52 KG/M2 | OXYGEN SATURATION: 100 % | WEIGHT: 155 LBS | TEMPERATURE: 98 F | HEIGHT: 62 IN | RESPIRATION RATE: 19 BRPM | DIASTOLIC BLOOD PRESSURE: 74 MMHG | HEART RATE: 94 BPM | SYSTOLIC BLOOD PRESSURE: 104 MMHG

## 2025-07-16 DIAGNOSIS — O03.4 INCOMPLETE MISCARRIAGE: ICD-10-CM

## 2025-07-16 DIAGNOSIS — R55 NEAR SYNCOPE: Primary | ICD-10-CM

## 2025-07-16 DIAGNOSIS — O26.899 ABDOMINAL PAIN IN PREGNANCY: ICD-10-CM

## 2025-07-16 DIAGNOSIS — R10.9 ABDOMINAL PAIN IN PREGNANCY: ICD-10-CM

## 2025-07-16 LAB
ALBUMIN SERPL-MCNC: 3.5 G/DL (ref 3.5–5)
ALBUMIN/GLOB SERPL: 0.9 RATIO (ref 1.1–2)
ALP SERPL-CCNC: 60 UNIT/L (ref 40–150)
ALT SERPL-CCNC: 18 UNIT/L (ref 0–55)
ANION GAP SERPL CALC-SCNC: 10 MEQ/L
AST SERPL-CCNC: 19 UNIT/L (ref 11–45)
B-HCG FREE SERPL-ACNC: ABNORMAL MIU/ML
B-HCG SERPL QL: POSITIVE
BASOPHILS # BLD AUTO: 0.04 X10(3)/MCL
BASOPHILS NFR BLD AUTO: 0.3 %
BILIRUB SERPL-MCNC: 0.4 MG/DL
BUN SERPL-MCNC: 10.9 MG/DL (ref 7–18.7)
CALCIUM SERPL-MCNC: 9 MG/DL (ref 8.4–10.2)
CHLORIDE SERPL-SCNC: 103 MMOL/L (ref 98–107)
CO2 SERPL-SCNC: 21 MMOL/L (ref 22–29)
CREAT SERPL-MCNC: 1.03 MG/DL (ref 0.55–1.02)
CREAT/UREA NIT SERPL: 11
EOSINOPHIL # BLD AUTO: 0.13 X10(3)/MCL (ref 0–0.9)
EOSINOPHIL NFR BLD AUTO: 1.1 %
ERYTHROCYTE [DISTWIDTH] IN BLOOD BY AUTOMATED COUNT: 13.6 % (ref 11.5–17)
GFR SERPLBLD CREATININE-BSD FMLA CKD-EPI: >60 ML/MIN/1.73/M2
GLOBULIN SER-MCNC: 4 GM/DL (ref 2.4–3.5)
GLUCOSE SERPL-MCNC: 117 MG/DL (ref 74–100)
GROUP & RH: NORMAL
HCT VFR BLD AUTO: 35.3 % (ref 37–47)
HGB BLD-MCNC: 11.2 G/DL (ref 12–16)
IMM GRANULOCYTES # BLD AUTO: 0.08 X10(3)/MCL (ref 0–0.04)
IMM GRANULOCYTES NFR BLD AUTO: 0.7 %
INDIRECT COOMBS: NORMAL
INR PPP: 1.1
LYMPHOCYTES # BLD AUTO: 2.96 X10(3)/MCL (ref 0.6–4.6)
LYMPHOCYTES NFR BLD AUTO: 25.3 %
MAGNESIUM SERPL-MCNC: 1.9 MG/DL (ref 1.6–2.6)
MCH RBC QN AUTO: 28.1 PG (ref 27–31)
MCHC RBC AUTO-ENTMCNC: 31.7 G/DL (ref 33–36)
MCV RBC AUTO: 88.5 FL (ref 80–94)
MONOCYTES # BLD AUTO: 0.74 X10(3)/MCL (ref 0.1–1.3)
MONOCYTES NFR BLD AUTO: 6.3 %
NEUTROPHILS # BLD AUTO: 7.77 X10(3)/MCL (ref 2.1–9.2)
NEUTROPHILS NFR BLD AUTO: 66.3 %
NRBC BLD AUTO-RTO: 0 %
OHS QRS DURATION: 70 MS
OHS QTC CALCULATION: 418 MS
PLATELET # BLD AUTO: 219 X10(3)/MCL (ref 130–400)
PMV BLD AUTO: 12 FL (ref 7.4–10.4)
POCT GLUCOSE: 131 MG/DL (ref 70–110)
POTASSIUM SERPL-SCNC: 3.8 MMOL/L (ref 3.5–5.1)
PROT SERPL-MCNC: 7.5 GM/DL (ref 6.4–8.3)
PROTHROMBIN TIME: 13.8 SECONDS (ref 12.5–14.5)
RBC # BLD AUTO: 3.99 X10(6)/MCL (ref 4.2–5.4)
SODIUM SERPL-SCNC: 134 MMOL/L (ref 136–145)
SPECIMEN OUTDATE: NORMAL
T4 FREE SERPL-MCNC: 1.03 NG/DL (ref 0.7–1.48)
TROPONIN I SERPL-MCNC: <0.01 NG/ML (ref 0–0.04)
TSH SERPL-ACNC: 0.32 UIU/ML (ref 0.35–4.94)
WBC # BLD AUTO: 11.72 X10(3)/MCL (ref 4.5–11.5)

## 2025-07-16 PROCEDURE — 84439 ASSAY OF FREE THYROXINE: CPT | Performed by: STUDENT IN AN ORGANIZED HEALTH CARE EDUCATION/TRAINING PROGRAM

## 2025-07-16 PROCEDURE — 93005 ELECTROCARDIOGRAM TRACING: CPT

## 2025-07-16 PROCEDURE — 93010 ELECTROCARDIOGRAM REPORT: CPT | Mod: ,,, | Performed by: INTERNAL MEDICINE

## 2025-07-16 PROCEDURE — 96361 HYDRATE IV INFUSION ADD-ON: CPT

## 2025-07-16 PROCEDURE — 86900 BLOOD TYPING SEROLOGIC ABO: CPT | Performed by: STUDENT IN AN ORGANIZED HEALTH CARE EDUCATION/TRAINING PROGRAM

## 2025-07-16 PROCEDURE — 99285 EMERGENCY DEPT VISIT HI MDM: CPT | Mod: 25

## 2025-07-16 PROCEDURE — 85025 COMPLETE CBC W/AUTO DIFF WBC: CPT | Performed by: STUDENT IN AN ORGANIZED HEALTH CARE EDUCATION/TRAINING PROGRAM

## 2025-07-16 PROCEDURE — 83735 ASSAY OF MAGNESIUM: CPT | Performed by: STUDENT IN AN ORGANIZED HEALTH CARE EDUCATION/TRAINING PROGRAM

## 2025-07-16 PROCEDURE — 84484 ASSAY OF TROPONIN QUANT: CPT | Performed by: STUDENT IN AN ORGANIZED HEALTH CARE EDUCATION/TRAINING PROGRAM

## 2025-07-16 PROCEDURE — 85610 PROTHROMBIN TIME: CPT | Performed by: STUDENT IN AN ORGANIZED HEALTH CARE EDUCATION/TRAINING PROGRAM

## 2025-07-16 PROCEDURE — 84703 CHORIONIC GONADOTROPIN ASSAY: CPT | Performed by: STUDENT IN AN ORGANIZED HEALTH CARE EDUCATION/TRAINING PROGRAM

## 2025-07-16 PROCEDURE — 63600175 PHARM REV CODE 636 W HCPCS: Mod: JZ,TB | Performed by: STUDENT IN AN ORGANIZED HEALTH CARE EDUCATION/TRAINING PROGRAM

## 2025-07-16 PROCEDURE — 96374 THER/PROPH/DIAG INJ IV PUSH: CPT

## 2025-07-16 PROCEDURE — 84443 ASSAY THYROID STIM HORMONE: CPT | Performed by: STUDENT IN AN ORGANIZED HEALTH CARE EDUCATION/TRAINING PROGRAM

## 2025-07-16 PROCEDURE — 84702 CHORIONIC GONADOTROPIN TEST: CPT | Performed by: STUDENT IN AN ORGANIZED HEALTH CARE EDUCATION/TRAINING PROGRAM

## 2025-07-16 PROCEDURE — 80053 COMPREHEN METABOLIC PANEL: CPT | Performed by: STUDENT IN AN ORGANIZED HEALTH CARE EDUCATION/TRAINING PROGRAM

## 2025-07-16 PROCEDURE — 82962 GLUCOSE BLOOD TEST: CPT

## 2025-07-16 RX ORDER — KETOROLAC TROMETHAMINE 30 MG/ML
15 INJECTION, SOLUTION INTRAMUSCULAR; INTRAVENOUS
Status: COMPLETED | OUTPATIENT
Start: 2025-07-16 | End: 2025-07-16

## 2025-07-16 RX ORDER — KETOROLAC TROMETHAMINE 30 MG/ML
15 INJECTION, SOLUTION INTRAMUSCULAR; INTRAVENOUS
Status: DISCONTINUED | OUTPATIENT
Start: 2025-07-16 | End: 2025-07-16

## 2025-07-16 RX ORDER — ONDANSETRON 4 MG/1
4 TABLET, ORALLY DISINTEGRATING ORAL EVERY 6 HOURS PRN
Qty: 12 TABLET | Refills: 0 | Status: SHIPPED | OUTPATIENT
Start: 2025-07-16

## 2025-07-16 RX ADMIN — KETOROLAC TROMETHAMINE 15 MG: 30 INJECTION, SOLUTION INTRAMUSCULAR; INTRAVENOUS at 08:07

## 2025-07-16 RX ADMIN — SODIUM CHLORIDE, POTASSIUM CHLORIDE, SODIUM LACTATE AND CALCIUM CHLORIDE 1000 ML: 600; 310; 30; 20 INJECTION, SOLUTION INTRAVENOUS at 04:07

## 2025-07-16 RX ADMIN — SODIUM CHLORIDE, POTASSIUM CHLORIDE, SODIUM LACTATE AND CALCIUM CHLORIDE 1000 ML: 600; 310; 30; 20 INJECTION, SOLUTION INTRAVENOUS at 03:07

## 2025-07-16 NOTE — DISCHARGE INSTRUCTIONS

## 2025-07-16 NOTE — ED PROVIDER NOTES
Encounter Date: 7/16/2025    SCRIBE #1 NOTE: I, Jasmin Jaquez, am scribing for, and in the presence of,  Bk Ayala MD. I have scribed the following portions of the note - Other sections scribed: HPI, ROS, PE.       History     Chief Complaint   Patient presents with    Loss of Consciousness     Here via ems from home states that she was feeling weak all day long and that tonight when she stood up to get out of bed she passed out. Pt. Currently awake alert GCS 15 no complaints of pain. CBG with ems 125     29 yo female with no PMHx presents to ED via EMS following a syncopal episode. Pt reports she was feeling weak today and fainted while standing up from bed. Pt also endorses abdominal pain and hematuria that began within the last week. Pt deneis any blood thinners, chest pain, shortness of breath, and has no other complaints at this time.    The history is provided by the patient. No  was used.     Review of patient's allergies indicates:  No Known Allergies  No past medical history on file.  No past surgical history on file.  No family history on file.  Social History[1]  Review of Systems   Constitutional:  Positive for fatigue. Negative for chills and fever.   HENT:  Negative for congestion, drooling and sore throat.    Eyes:  Negative for pain and visual disturbance.   Respiratory:  Negative for chest tightness, shortness of breath and wheezing.    Cardiovascular:  Negative for chest pain, palpitations and leg swelling.   Gastrointestinal:  Positive for abdominal pain. Negative for nausea and vomiting.   Genitourinary:  Positive for hematuria. Negative for dysuria.   Musculoskeletal:  Negative for back pain, neck pain and neck stiffness.   Skin:  Negative for pallor and rash.   Neurological:  Positive for syncope. Negative for weakness and numbness.   Hematological:  Does not bruise/bleed easily.       Physical Exam     Initial Vitals [07/16/25 0224]   BP Pulse Resp Temp SpO2    111/79 86 18 98.5 °F (36.9 °C) 100 %      MAP       --         Physical Exam    Nursing note and vitals reviewed.  Constitutional: She appears well-developed and well-nourished. She is not diaphoretic. No distress.   HENT:   Head: Normocephalic and atraumatic.   Nose: Nose normal. Mouth/Throat: Oropharynx is clear and moist.   Eyes: EOM are normal. Pupils are equal, round, and reactive to light.   Neck: Neck supple.   Normal range of motion.  Cardiovascular:  Normal rate and regular rhythm.           No murmur heard.  Pulmonary/Chest: Breath sounds normal. No respiratory distress. She has no wheezes. She has no rales.   Abdominal: Abdomen is soft. She exhibits no distension. There is no abdominal tenderness.   Musculoskeletal:      Cervical back: Normal range of motion and neck supple.     Neurological: She is alert and oriented to person, place, and time. She has normal strength. No cranial nerve deficit or sensory deficit.   Skin: Skin is warm. Capillary refill takes less than 2 seconds. No rash noted.         ED Course   Procedures  Labs Reviewed   COMPREHENSIVE METABOLIC PANEL - Abnormal       Result Value    Sodium 134 (*)     Potassium 3.8      Chloride 103      CO2 21 (*)     Glucose 117 (*)     Blood Urea Nitrogen 10.9      Creatinine 1.03 (*)     Calcium 9.0      Protein Total 7.5      Albumin 3.5      Globulin 4.0 (*)     Albumin/Globulin Ratio 0.9 (*)     Bilirubin Total 0.4      ALP 60      ALT 18      AST 19      eGFR >60      Anion Gap 10.0      BUN/Creatinine Ratio 11     TSH - Abnormal    TSH 0.324 (*)    CBC WITH DIFFERENTIAL - Abnormal    WBC 11.72 (*)     RBC 3.99 (*)     Hgb 11.2 (*)     Hct 35.3 (*)     MCV 88.5      MCH 28.1      MCHC 31.7 (*)     RDW 13.6      Platelet 219      MPV 12.0 (*)     Neut % 66.3      Lymph % 25.3      Mono % 6.3      Eos % 1.1      Basophil % 0.3      Imm Grans % 0.7      Neut # 7.77      Lymph # 2.96      Mono # 0.74      Eos # 0.13      Baso # 0.04      Imm Gran  # 0.08 (*)     NRBC% 0.0     HCG, SERUM, QUALITATIVE - Abnormal    Beta HCG Qual Positive (*)    HCG, QUANTITATIVE - Abnormal    Beta HCG Quant 130,779.73 (*)    POCT GLUCOSE - Abnormal    POCT Glucose 131 (*)    TROPONIN I - Normal    Troponin-I <0.010     MAGNESIUM - Normal    Magnesium Level 1.90     T4, FREE - Normal    Thyroxine Free 1.03     PROTIME-INR - Normal    PT 13.8      INR 1.1      Narrative:     Protimes are used to monitor anticoagulant agents such as warfarin. PT INR values are based on the current patient normal mean and the JAYESH value for the specific instrument reagent used.  **Routine theraputic target values for the INR are 2.0-3.0**   CBC W/ AUTO DIFFERENTIAL    Narrative:     The following orders were created for panel order CBC auto differential.  Procedure                               Abnormality         Status                     ---------                               -----------         ------                     CBC with Differential[[id number]]       Abnormal            Final result                 Please view results for these tests on the individual orders.   URINALYSIS, REFLEX TO URINE CULTURE   PREGNANCY TEST, URINE RAPID   POCT GLUCOSE, HAND-HELD DEVICE   TYPE & SCREEN    Group & Rh A POS      Indirect Kelli GEL NEG      Specimen Outdate 07/19/2025 23:59            Imaging Results              US OB <14 Wks, TransAbd, Single Gestation (Final result)  Result time 07/16/25 06:53:36      Final result by Donnell Argueta MD (07/16/25 06:53:36)                   Impression:      1. No intrauterine pregnancy.  2. Heterogeneous hyperechoic area along the cervical canal possibly blood products or other products of conception.  No internal vascularity.      Electronically signed by: Donnell Argueta  Date:    07/16/2025  Time:    06:53               Narrative:    EXAMINATION:  US OB <14 WEEKS TRANSABDOM, SINGLE GESTATION    CLINICAL HISTORY:  Other specified pregnancy related conditions,  unspecified trimester    COMPARISON:  None this pregnancy    FINDINGS:  Transabdominal scanning of the pelvis with grayscale, color and spectral Doppler.    Anteverted uterus measures 17 cm in length.  No endometrial thickening.  No intrauterine pregnancy seen.  Heterogeneous enlarged cervix with heterogeneous hyperechoic lesion along the cervical canal.  No internal vascularity.    Ovaries are normal in appearance for age with vascular flow demonstrated.    No adnexal mass or significant pelvic free fluid.                                       X-Ray Chest AP Portable (Final result)  Result time 07/16/25 06:12:39      Final result by Donnell Argueta MD (07/16/25 06:12:39)                   Impression:      No acute findings.      Electronically signed by: Donnell Argueta  Date:    07/16/2025  Time:    06:12               Narrative:    EXAMINATION:  XR CHEST AP PORTABLE    CLINICAL HISTORY:  Syncope and collapse    COMPARISON:  No priors    FINDINGS:  Frontal view of the chest was obtained. The heart is not enlarged.  Lungs are clear.  There is no pneumothorax or significant effusion.                                       Medications   lactated ringers bolus 1,000 mL (0 mLs Intravenous Stopped 7/16/25 0722)   lactated ringers bolus 1,000 mL (0 mLs Intravenous Stopped 7/16/25 0722)   ketorolac injection 15 mg (15 mg Intravenous Given 7/16/25 0837)   Differential diagnosis (includes but is not limited to):   ACS, arrhythmia, dehydration, kidney injury, ectopic pregnancy, vaginal bleeding in pregnancy, symptomatic anemia    MDM Narrative  30-year-old female presents for evaluation of a near syncopal episode at home.  She reports some generalized abdominal cramping.  She reports some vaginal spotting.  She states he recently stopped her birth control.  IV fluid rehydration ongoing.  EKG and x-ray reviewed.  Labs performed and reviewed.  Pregnancy test is positive, beta quant obtained in his over 130,000. Ultrasound  performed and reviewed, appears patient is currently miscarrying.  I had a long extensive discussion with the about this with the patient.  I recommended a pelvic exam to evacuate clots and tissue and for further evaluation.  Patient has declined, stating she wants to go home and would prefer to be seen by her OBGYN for continued follow up.  We will give anti-inflammatories for pain control and to assist with the bleeding.  I have stressed the importance of following up with her OBGYN for close monitoring and follow up of her symptoms and definitive management of her care.  She states she understands and will do so.  Patient is requesting discharge home.  Strict return precautions discussed and understood.  Patient is ambulatory at her baseline with a steady gait without lightheadedness or dizziness, hypoxia, tachypnea, tachycardia.  Patient states she will return if her symptoms worsen or if she has any other new concerns.  Prescriptions provided.    Dispo: Discharge    My independent radiology interpretation: as above  Point of care US (independently performed and interpreted):   Decision rules/clinical scoring:     Sepsis Perfusion Assessment:     Amount and/or Complexity of Data Reviewed  Independent historian: none   Summary of history:   External data reviewed: notes from previous ED visits and notes from clinic visits  Summary of data reviewed: Prior records reviewed  Risk and benefits of testing: discussed   Labs: ordered and reviewed  Radiology: ordered and independent interpretation performed (see above or ED course)  ECG/medicine tests: ordered and independent interpretation performed (see above or ED course)  Discussion of management or test interpretation with external provider(s): none   Summary of discussion:     Risk  OTC medications  Prescription drug management   Shared decision making     Critical Care  none    Data Reviewed/Counseling: I have personally reviewed the patient's vital signs, nursing  notes, and other relevant tests, information, and imaging. I had a detailed discussion regarding the historical points, exam findings, and any diagnostic results supporting the discharge diagnosis. I personally performed the history, PE, MDM and procedures as documented above and agree with the scribe's documentation.    Portions of this note were dictated using voice recognition software. Although it was reviewed for accuracy, some inherent voice recognition errors may have occurred and may be present in this document.            Scribe Attestation:   Scribe #1: I performed the above scribed service and the documentation accurately describes the services I performed. I attest to the accuracy of the note.    Attending Attestation:           Physician Attestation for Scribe:  Physician Attestation Statement for Scribe #1: I, Bk Ayala MD, reviewed documentation, as scribed by Jasmin Jaquez in my presence, and it is both accurate and complete.             ED Course as of 07/16/25 0903   Wed Jul 16, 2025   0315 EKG independently interpreted by me.  EKG: NSR @ 76, no STEMI, Qtc 418 []   0342 X-Ray Chest AP Portable  Independently visualized/reviewed by me during the ED visit.  - No lobar consolidation or PTX []   0518 Beta HCG Qual(!): Positive  Quant and US ordered. []   0738 Patient blood type is A+ on chart review, no indication for RhoGAM. []      ED Course User Index  [MC] Bk Ayala MD                               Clinical Impression:  Final diagnoses:  [O26.899, R10.9] Abdominal pain in pregnancy  [R55] Near syncope (Primary)  [O03.4] Incomplete miscarriage          ED Disposition Condition    Discharge Stable          ED Prescriptions       Medication Sig Dispense Start Date End Date Auth. Provider    ondansetron (ZOFRAN-ODT) 4 MG TbDL Take 1 tablet (4 mg total) by mouth every 6 (six) hours as needed (Nausea). 12 tablet 7/16/2025 -- Bk Ayala MD          Follow-up  Information       Follow up With Specialties Details Why Contact Info    Your OBGYN  Call today      Alec Jaffe MD Family Medicine Schedule an appointment as soon as possible for a visit in 2 days  [address] [facility] [id number] [phone]      [facility]  Schedule an appointment as soon as possible for a visit in 2 days  [address] [facility] [address]  [phone]      [facility] - Emergency Dept Emergency Medicine  If symptoms worsen [address] [facility] [phone]                     [1]   Social History  Tobacco Use    Smoking status: Never    Smokeless tobacco: Never        Bk Ayala MD  07/16/25 0903

## 2025-07-21 ENCOUNTER — LAB VISIT (OUTPATIENT)
Dept: LAB | Facility: HOSPITAL | Age: 30
End: 2025-07-21
Payer: MEDICAID

## 2025-07-21 ENCOUNTER — OFFICE VISIT (OUTPATIENT)
Dept: FAMILY MEDICINE | Facility: CLINIC | Age: 30
End: 2025-07-21
Payer: MEDICAID

## 2025-07-21 VITALS
DIASTOLIC BLOOD PRESSURE: 76 MMHG | OXYGEN SATURATION: 100 % | BODY MASS INDEX: 28.52 KG/M2 | TEMPERATURE: 98 F | HEIGHT: 62 IN | SYSTOLIC BLOOD PRESSURE: 112 MMHG | RESPIRATION RATE: 18 BRPM | WEIGHT: 155 LBS | HEART RATE: 106 BPM

## 2025-07-21 DIAGNOSIS — O03.9 MISCARRIAGE: Primary | ICD-10-CM

## 2025-07-21 DIAGNOSIS — Z30.019 ENCOUNTER FOR INITIAL PRESCRIPTION OF CONTRACEPTIVES, UNSPECIFIED CONTRACEPTIVE: ICD-10-CM

## 2025-07-21 DIAGNOSIS — O03.9 MISCARRIAGE: ICD-10-CM

## 2025-07-21 DIAGNOSIS — Z09 HOSPITAL DISCHARGE FOLLOW-UP: ICD-10-CM

## 2025-07-21 LAB — B-HCG FREE SERPL-ACNC: ABNORMAL MIU/ML

## 2025-07-21 PROCEDURE — 99213 OFFICE O/P EST LOW 20 MIN: CPT | Mod: PBBFAC

## 2025-07-21 PROCEDURE — 84702 CHORIONIC GONADOTROPIN TEST: CPT

## 2025-07-21 PROCEDURE — 36415 COLL VENOUS BLD VENIPUNCTURE: CPT

## 2025-07-21 NOTE — PROGRESS NOTES
[facility] Progress Note    ID:  Yaz Hutchinson   MRN:  [id number]     7/21/2025    Chief Complaint:    Chief Complaint   Patient presents with    hospital followup     Patient would like birth control.     History of Present Illness:  Yaz Hutchinson is a 30 y.o. female who presents to [facility] clinic for hospital follow up    Interval history:  Patient was previously on Depo shot but was last seen on 07/29/2024.  Has missed all of her subsequent follow up appointment since then.  Patient was recently seen in the hospital on 07/16/2025 in the emergency room after having a loss of consciousness episode.  Patient was feeling weak and when she stood up to go to bed she passed out and was brought to the ED at [facility].  Reported some abdominal pain and hematuria at that time for the past week.  And not been on any birth control prior to the syncope episode and lab work obtain in the ED showed a positive pregnancy test and a beta Quant of over 130,000.  Ultrasound performed in the ED appeared that patient was currently miscarrying.  Pelvic exam was offered but patient declined due to preferring her OB gyn to performed.  Patient was instructed to follow with her Ob gyn.  Patient otherwise felt better and was discharged from the ED with follow up. Today, patient reports still having some spotting but no clots or large amount of vaginal bleeding. Last sexual activity was end of May. Patient interested in birth control today. Would prefer to have Depo shot again. Otherwise, denies any fevers, chills, n/v, CP, or SOB.    Current Outpatient Medications   Medication Instructions    medroxyPROGESTERone (DEPO-PROVERA) 150 mg, Intramuscular, Once    ondansetron (ZOFRAN-ODT) 4 mg, Oral, Every 6 hours PRN       Review of Systems:  Pertinent positives and negatives as mentioned in HPI    Objective:    Vitals:    07/21/25 1345   BP: 112/76   Pulse: 106   Resp: 18   Temp: 98.2 °F (36.8 °C)       Physical Exam  Constitutional:        General: She is not in acute distress.     Appearance: Normal appearance. She is not ill-appearing.   HENT:      Head: Normocephalic and atraumatic.   Eyes:      General: No scleral icterus.  Cardiovascular:      Rate and Rhythm: Normal rate and regular rhythm.      Pulses: Normal pulses.      Heart sounds: Normal heart sounds.   Pulmonary:      Effort: Pulmonary effort is normal.      Breath sounds: Normal breath sounds. No stridor. No wheezing.   Abdominal:      General: Abdomen is flat. Bowel sounds are normal. There is no distension.      Palpations: Abdomen is soft.      Tenderness: There is no abdominal tenderness. There is no guarding.   Musculoskeletal:         General: Normal range of motion.      Cervical back: Normal range of motion and neck supple.   Skin:     General: Skin is warm.      Coloration: Skin is not jaundiced.   Neurological:      Mental Status: She is alert.             Assessment/Plan:  Yaz was seen today for hospital followup.    Diagnoses and all orders for this visit:    Miscarriage  -     Ambulatory referral/consult to Obstetrics / Gynecology; Future  -     HCG, Quantitative; Future    Encounter for initial prescription of contraceptives, unspecified contraceptive  -     Cancel: POCT Urine Pregnancy    Hospital discharge follow-up        Health Maintenance   Topic Date Due    Lipid Panel  Never done    COVID-19 Vaccine (1 - 2024-25 season) Never done    Influenza Vaccine (1) 09/01/2025    Cervical Cancer Screening  10/09/2026    TETANUS VACCINE  07/31/2028    RSV Vaccine (Age 60+ and Pregnant patients) (1 - 1-dose 75+ series) 03/14/2068    Hepatitis C Screening  Completed    HIV Screening  Completed    Pneumococcal Vaccines (Age 0-49)  Aged Out     30-year-old female presenting for hospital follow up and contraception management    - OBGYN referral for miscarriage management  - Will repeat Beta HCG quant today to ensure downtrending value  - Interested in restarting depo provera  shot for birth control, given patient's recent miscarriage will defer starting depo shot until patient is able to be evaluated by OBGYN and cleared for depo shot, patient able to get depo shot at this clinic or with OBGYN if applicable      Follow up in about 3 months (around 10/21/2025) for Follow up.    Future Appointments   Date Time Provider Department Center   10/21/2025  1:00 PM Alec Jaffe MD [facility] [place] Osmani Jaffe MD  [facility]